# Patient Record
Sex: FEMALE | Race: WHITE | NOT HISPANIC OR LATINO | URBAN - METROPOLITAN AREA
[De-identification: names, ages, dates, MRNs, and addresses within clinical notes are randomized per-mention and may not be internally consistent; named-entity substitution may affect disease eponyms.]

---

## 2023-08-11 ENCOUNTER — OFFICE (OUTPATIENT)
Dept: URBAN - METROPOLITAN AREA CLINIC 64 | Facility: CLINIC | Age: 83
End: 2023-08-11

## 2023-08-11 VITALS
SYSTOLIC BLOOD PRESSURE: 143 MMHG | WEIGHT: 228 LBS | HEIGHT: 66 IN | DIASTOLIC BLOOD PRESSURE: 87 MMHG | HEART RATE: 67 BPM

## 2023-08-11 DIAGNOSIS — K75.4 AUTOIMMUNE HEPATITIS: ICD-10-CM

## 2023-08-11 DIAGNOSIS — K21.9 GASTRO-ESOPHAGEAL REFLUX DISEASE WITHOUT ESOPHAGITIS: ICD-10-CM

## 2023-08-11 DIAGNOSIS — K74.60 UNSPECIFIED CIRRHOSIS OF LIVER: ICD-10-CM

## 2023-08-11 DIAGNOSIS — R19.7 DIARRHEA, UNSPECIFIED: ICD-10-CM

## 2023-08-11 DIAGNOSIS — Z86.010 PERSONAL HISTORY OF COLONIC POLYPS: ICD-10-CM

## 2023-08-11 DIAGNOSIS — I85.00 ESOPHAGEAL VARICES WITHOUT BLEEDING: ICD-10-CM

## 2023-08-11 PROCEDURE — 99204 OFFICE O/P NEW MOD 45 MIN: CPT | Performed by: INTERNAL MEDICINE

## 2023-08-11 RX ORDER — PANTOPRAZOLE SODIUM 20 MG/1
20 TABLET, DELAYED RELEASE ORAL
Qty: 90 | Refills: 3 | Status: ACTIVE
Start: 2023-08-11

## 2023-08-11 RX ORDER — AZATHIOPRINE 50 MG/1
50 TABLET ORAL
Qty: 90 | Refills: 3 | Status: ACTIVE
Start: 2023-08-11

## 2023-08-15 ENCOUNTER — TRANSCRIBE ORDERS (OUTPATIENT)
Dept: ADMINISTRATIVE | Facility: HOSPITAL | Age: 83
End: 2023-08-15

## 2023-08-15 DIAGNOSIS — K74.69 OTHER CIRRHOSIS OF LIVER: ICD-10-CM

## 2023-08-15 DIAGNOSIS — K75.4 HEPATITIS, AUTOIMMUNE: Primary | ICD-10-CM

## 2023-08-31 ENCOUNTER — HOSPITAL ENCOUNTER (OUTPATIENT)
Dept: ULTRASOUND IMAGING | Facility: HOSPITAL | Age: 83
Discharge: HOME | End: 2023-08-31
Admitting: INTERNAL MEDICINE
Payer: MEDICARE

## 2023-08-31 DIAGNOSIS — K75.4 HEPATITIS, AUTOIMMUNE: ICD-10-CM

## 2023-08-31 DIAGNOSIS — K74.69 OTHER CIRRHOSIS OF LIVER: ICD-10-CM

## 2023-08-31 PROCEDURE — 76705 ECHO EXAM OF ABDOMEN: CPT

## 2024-04-05 ENCOUNTER — OFFICE (OUTPATIENT)
Dept: URBAN - METROPOLITAN AREA CLINIC 64 | Facility: CLINIC | Age: 84
End: 2024-04-05
Payer: COMMERCIAL

## 2024-04-05 ENCOUNTER — TRANSCRIBE ORDERS (OUTPATIENT)
Dept: ADMINISTRATIVE | Facility: HOSPITAL | Age: 84
End: 2024-04-05
Payer: MEDICARE

## 2024-04-05 VITALS
DIASTOLIC BLOOD PRESSURE: 78 MMHG | WEIGHT: 227 LBS | DIASTOLIC BLOOD PRESSURE: 83 MMHG | HEIGHT: 66 IN | HEART RATE: 62 BPM | SYSTOLIC BLOOD PRESSURE: 154 MMHG | SYSTOLIC BLOOD PRESSURE: 147 MMHG

## 2024-04-05 DIAGNOSIS — K75.4 AUTOIMMUNE HEPATITIS: Primary | ICD-10-CM

## 2024-04-05 DIAGNOSIS — K21.9 GASTRO-ESOPHAGEAL REFLUX DISEASE WITHOUT ESOPHAGITIS: ICD-10-CM

## 2024-04-05 DIAGNOSIS — K75.4 AUTOIMMUNE HEPATITIS: ICD-10-CM

## 2024-04-05 DIAGNOSIS — K74.60 HEPATIC CIRRHOSIS, UNSPECIFIED HEPATIC CIRRHOSIS TYPE, UNSPECIFIED WHETHER ASCITES PRESENT: ICD-10-CM

## 2024-04-05 DIAGNOSIS — K74.60 UNSPECIFIED CIRRHOSIS OF LIVER: ICD-10-CM

## 2024-04-05 DIAGNOSIS — I85.00 ESOPHAGEAL VARICES WITHOUT BLEEDING: ICD-10-CM

## 2024-04-05 PROCEDURE — 99214 OFFICE O/P EST MOD 30 MIN: CPT | Performed by: INTERNAL MEDICINE

## 2024-04-11 ENCOUNTER — HOSPITAL ENCOUNTER (OUTPATIENT)
Dept: ULTRASOUND IMAGING | Facility: HOSPITAL | Age: 84
Discharge: HOME | End: 2024-04-11
Payer: MEDICARE

## 2024-04-11 DIAGNOSIS — K75.4 AUTOIMMUNE HEPATITIS: ICD-10-CM

## 2024-04-11 DIAGNOSIS — K74.60 HEPATIC CIRRHOSIS, UNSPECIFIED HEPATIC CIRRHOSIS TYPE, UNSPECIFIED WHETHER ASCITES PRESENT: ICD-10-CM

## 2024-04-11 PROCEDURE — 76705 ECHO EXAM OF ABDOMEN: CPT

## 2024-05-29 NOTE — PROGRESS NOTES
New Shoulder      Patient: Elayne Stanford        YOB: 1940    Medical Record Number: [id number]        Chief Complaints: Right shoulder pain      History of Present Illness: This is a 83-year-old female who injured her right shoulder when she picked up a heavy object about 6 weeks ago injuring her shoulder she states she has severe pain at the time it is improved a little bit currently it is a 5 out of 10 past medical history as listed below and reviewed by me      Allergies:   Allergies   Allergen Reactions    Betadine [Povidone Iodine] Itching    Contrast Dye (Echo Or Unknown Ct/Mr) Itching    Latex Itching    Phenergan [Promethazine] Other (See Comments)     Lost a day and a half    Sulfa Antibiotics Itching       Medications:   Home Medications:  Current Outpatient Medications on File Prior to Visit   Medication Sig    amLODIPine (NORVASC) 5 MG tablet 90    Aspirin 81 MG capsule     atorvastatin (LIPITOR) 40 MG tablet 90    azaTHIOprine (IMURAN) 50 MG tablet     Biotin 1 MG capsule 0    celecoxib (CeleBREX) 200 MG capsule 180    Cholecalciferol 50 MCG (2000 UT) capsule 0    fexofenadine (Allegra Allergy) 60 MG tablet 0    irbesartan (AVAPRO) 150 MG tablet 90    levothyroxine (SYNTHROID, LEVOTHROID) 125 MCG tablet 90    pantoprazole (PROTONIX) 20 MG EC tablet TAKE 1 TABLET BY MOUTH EVERY MORNING 30 MINUTES BEFORE A MEAL    propranolol LA (INDERAL LA) 120 MG 24 hr capsule Take 1 capsule by mouth Every 12 (Twelve) Hours.    propranolol LA (INDERAL LA) 160 MG 24 hr capsule Take 1 capsule by mouth Daily.     No current facility-administered medications on file prior to visit.     Current Medications:  Scheduled Meds:  Continuous Infusions:No current facility-administered medications for this visit.    PRN Meds:.    Past Medical History:   Diagnosis Date    Arthritis of neck     Frozen shoulder 4/24    Reason for visit    Knee swelling     Replacements-both    Low back strain 2/24    Periarthritis  "of shoulder     Reason for visit    Rotator cuff syndrome     Reason for visit        Past Surgical History:   Procedure Laterality Date    FOOT SURGERY  15 years ago    Bone spur    JOINT REPLACEMENT  10 years    Both knees replaced    KNEE SURGERY  ?    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 female  General Appearance:    Alert, cooperative, in no acute distress                   Vitals:    05/30/24 1438   Temp: 98.8 °F (37.1 °C)   Weight: 100 kg (221 lb 4.8 oz)   Height: 167.6 cm (66\")   PainSc:   5      Patient is alert and read ×3 no acute distress appears her above-listed at height weight and age.  Affect is normal respiratory rate is normal unlabored. Heart rate regular rate rhythm, sclera, dentition and hearing are normal for the purpose of this exam.    Ortho Exam  Physical exam of the right shoulder reveals no overlying skin changes no lymphedema no lymphadenopathy.  Patient has active flexion 180 with mild symptoms abduction is similar external rotation is to 50 and internal rotation to the upper lumbar spine with mild symptoms.  Patient has good rotator cuff strength 4+ over 5 with isometric strength testing with pain.  Patient has a " positive impingement and a positive Acosta sign.  Patient has good cervical range of motion which is full and asymptomatic no radicular symptoms.  Patient has a normal elbow exam.  Good distal pulses are present  Patient has pain with overhead activity and a positive Neer sign and a positive empty can sign , a positive drop arm and a definitive painful arc   Large Joint Arthrocentesis: R subacromial bursa  Date/Time: 5/30/2024 3:15 PM  Consent given by: patient  Site marked: site marked  Timeout: Immediately prior to procedure a time out was called to verify the correct patient, procedure, equipment, support staff and site/side marked as required   Supporting Documentation  Indications: pain   Procedure Details  Location: shoulder - R subacromial bursa  Preparation: Patient was prepped and draped in the usual sterile fashion  Needle gauge: 21G.  Approach: posterior  Medications administered: 80 mg methylPREDNISolone acetate 80 MG/ML; 2 mL lidocaine PF 1% 1 %  Patient tolerance: patient tolerated the procedure well with no immediate complications              Radiology:   AP, Scapular Y and Axillary Lateral of the right shoulder were ordered/reviewed to evauate shoulder pain.  I have no comparative films she has severe acromioclavicular arthritis as well as some sclerosis at the insertion of the cuff no obvious fracture  Imaging Results (Most Recent)       Procedure Component Value Units Date/Time    XR Shoulder 2+ View Right [[id number]] Resulted: 05/30/24 1429     Updated: 05/30/24 1430    Impression:      Ordering physician's impression is located in the Encounter Note dated 05/30/24. X-ray performed in the DR room.            Assessment/Plan: Right shoulder pain I suspect this is rotator cuff probably acute on chronic plan is to proceed with injection as a diagnostic and therapeutic tool started with physical therapy should she fail this we will pursue other means of testing  Cortisone Injection. See procedure  note.  Cortisone Injection for DIAGNOSTIC and THERAPUTIC purposes.

## 2024-05-30 ENCOUNTER — OFFICE VISIT (OUTPATIENT)
Dept: ORTHOPEDIC SURGERY | Facility: CLINIC | Age: 84
End: 2024-05-30
Payer: MEDICARE

## 2024-05-30 VITALS — HEIGHT: 66 IN | TEMPERATURE: 98.8 F | WEIGHT: 221.3 LBS | BODY MASS INDEX: 35.57 KG/M2

## 2024-05-30 DIAGNOSIS — M25.512 LEFT SHOULDER PAIN, UNSPECIFIED CHRONICITY: Primary | ICD-10-CM

## 2024-05-30 DIAGNOSIS — M25.511 RIGHT SHOULDER PAIN, UNSPECIFIED CHRONICITY: ICD-10-CM

## 2024-05-30 DIAGNOSIS — M75.101 TEAR OF RIGHT ROTATOR CUFF, UNSPECIFIED TEAR EXTENT, UNSPECIFIED WHETHER TRAUMATIC: ICD-10-CM

## 2024-05-30 PROCEDURE — 99203 OFFICE O/P NEW LOW 30 MIN: CPT | Performed by: ORTHOPAEDIC SURGERY

## 2024-05-30 PROCEDURE — 1160F RVW MEDS BY RX/DR IN RCRD: CPT | Performed by: ORTHOPAEDIC SURGERY

## 2024-05-30 PROCEDURE — 73030 X-RAY EXAM OF SHOULDER: CPT | Performed by: ORTHOPAEDIC SURGERY

## 2024-05-30 PROCEDURE — 1159F MED LIST DOCD IN RCRD: CPT | Performed by: ORTHOPAEDIC SURGERY

## 2024-05-30 PROCEDURE — 20610 DRAIN/INJ JOINT/BURSA W/O US: CPT | Performed by: ORTHOPAEDIC SURGERY

## 2024-05-30 RX ORDER — CELECOXIB 200 MG/1
CAPSULE ORAL
COMMUNITY

## 2024-05-30 RX ORDER — ATORVASTATIN CALCIUM 40 MG/1
TABLET, FILM COATED ORAL
COMMUNITY

## 2024-05-30 RX ORDER — AZATHIOPRINE 50 MG/1
TABLET ORAL
COMMUNITY
Start: 2024-04-11

## 2024-05-30 RX ORDER — FEXOFENADINE HCL 60 MG/1
TABLET, FILM COATED ORAL
COMMUNITY

## 2024-05-30 RX ORDER — IRBESARTAN 150 MG/1
TABLET ORAL
COMMUNITY

## 2024-05-30 RX ORDER — PROPRANOLOL HYDROCHLORIDE 120 MG/1
1 CAPSULE, EXTENDED RELEASE ORAL EVERY 12 HOURS SCHEDULED
COMMUNITY
Start: 2024-02-20

## 2024-05-30 RX ORDER — AMLODIPINE BESYLATE 5 MG/1
TABLET ORAL
COMMUNITY

## 2024-05-30 RX ORDER — PANTOPRAZOLE SODIUM 20 MG/1
TABLET, DELAYED RELEASE ORAL
COMMUNITY
Start: 2024-03-12

## 2024-05-30 RX ORDER — LEVOTHYROXINE SODIUM 0.12 MG/1
TABLET ORAL
COMMUNITY

## 2024-05-30 RX ORDER — PROPRANOLOL HYDROCHLORIDE 160 MG/1
160 CAPSULE, EXTENDED RELEASE ORAL DAILY
COMMUNITY

## 2024-05-30 RX ORDER — NICOTINE POLACRILEX 2 MG
GUM BUCCAL
COMMUNITY

## 2024-05-30 RX ADMIN — METHYLPREDNISOLONE ACETATE 80 MG: 80 INJECTION, SUSPENSION INTRA-ARTICULAR; INTRALESIONAL; INTRAMUSCULAR; SOFT TISSUE at 15:15

## 2024-05-30 RX ADMIN — LIDOCAINE HYDROCHLORIDE 2 ML: 10 INJECTION, SOLUTION EPIDURAL; INFILTRATION; INTRACAUDAL; PERINEURAL at 15:15

## 2024-06-04 RX ORDER — METHYLPREDNISOLONE ACETATE 80 MG/ML
80 INJECTION, SUSPENSION INTRA-ARTICULAR; INTRALESIONAL; INTRAMUSCULAR; SOFT TISSUE
Status: COMPLETED | OUTPATIENT
Start: 2024-05-30 | End: 2024-05-30

## 2024-06-04 RX ORDER — LIDOCAINE HYDROCHLORIDE 10 MG/ML
2 INJECTION, SOLUTION EPIDURAL; INFILTRATION; INTRACAUDAL; PERINEURAL
Status: COMPLETED | OUTPATIENT
Start: 2024-05-30 | End: 2024-05-30

## 2024-07-02 ENCOUNTER — OFFICE VISIT (OUTPATIENT)
Dept: SPORTS MEDICINE | Facility: CLINIC | Age: 84
End: 2024-07-02
Payer: MEDICARE

## 2024-07-02 VITALS
HEIGHT: 66 IN | DIASTOLIC BLOOD PRESSURE: 80 MMHG | WEIGHT: 221 LBS | BODY MASS INDEX: 35.52 KG/M2 | HEART RATE: 64 BPM | OXYGEN SATURATION: 93 % | SYSTOLIC BLOOD PRESSURE: 172 MMHG | TEMPERATURE: 98.4 F

## 2024-07-02 DIAGNOSIS — R29.898 LEFT LEG WEAKNESS: ICD-10-CM

## 2024-07-02 DIAGNOSIS — G89.29 CHRONIC MIDLINE LOW BACK PAIN WITH LEFT-SIDED SCIATICA: Primary | ICD-10-CM

## 2024-07-02 DIAGNOSIS — M54.42 CHRONIC MIDLINE LOW BACK PAIN WITH LEFT-SIDED SCIATICA: Primary | ICD-10-CM

## 2024-07-02 DIAGNOSIS — M51.36 DDD (DEGENERATIVE DISC DISEASE), LUMBAR: ICD-10-CM

## 2024-07-02 PROCEDURE — 1159F MED LIST DOCD IN RCRD: CPT | Performed by: STUDENT IN AN ORGANIZED HEALTH CARE EDUCATION/TRAINING PROGRAM

## 2024-07-02 PROCEDURE — 1160F RVW MEDS BY RX/DR IN RCRD: CPT | Performed by: STUDENT IN AN ORGANIZED HEALTH CARE EDUCATION/TRAINING PROGRAM

## 2024-07-02 PROCEDURE — 99203 OFFICE O/P NEW LOW 30 MIN: CPT | Performed by: STUDENT IN AN ORGANIZED HEALTH CARE EDUCATION/TRAINING PROGRAM

## 2024-07-02 RX ORDER — CYANOCOBALAMIN/FOLIC ACID 1MG-400MCG
TABLET, SUBLINGUAL SUBLINGUAL
COMMUNITY

## 2024-07-02 RX ORDER — TRAMADOL HYDROCHLORIDE 50 MG/1
TABLET ORAL
COMMUNITY

## 2024-07-02 RX ORDER — ALBUTEROL SULFATE 90 UG/1
2 AEROSOL, METERED RESPIRATORY (INHALATION) EVERY 6 HOURS PRN
COMMUNITY
Start: 2024-03-14

## 2024-07-02 NOTE — PROGRESS NOTES
Chief Complaint   Patient presents with    Lumbar Spine - Initial Evaluation       History of Present Illness  Elayne is a 83 y.o. year old female here today for low back pain that has been present for about 2 years with no known injury or trauma. Does admit that pain started around the time that her 's health declined before he passed away and she was doing a lot to help care for him, move him, etc.   Pain varies and is described as a shooting pain  Pain is located at the left side of the low back and radiates down the back of the left leg to the knee.   Admits to associated numbness of left 3rd-5th toes.  Also states that occasionally all her toe nails will turn red  Pain worsens with increased activity. Started using a wheelchair for longer distance walks[place] airport.   Has been managing symptoms with Tramadol and Celebrex, which she is prescribed for chronic pain.   Was unable to get refill of the tramadol because of a recent move 2 years ago and changes to her PCP. Just established with new PCP yesterday was given a prescription of tramadol but she has not yet started it.   Denies any previous injury or occurrence.  However, she does have a history of RA and bilateral TKA.  Also has a history of autoimmune cirrhosis of the liver.      The following data was reviewed by: Kassi Zaragoza DO on 07/02/2024:  Data reviewed :        COMPREHENSIVE METABOLIC PANEL  Order: [id number]  Component  Ref Range & Units 9 mo ago   Sodium  136 - 145 mmol/L 134 Low    Comment: (note)  Excess protein and/or lipids can falsely decrease sodium levels  (pseudo hyponatremia).   Potassium  3.5 - 5.1 mmol/L 4.5   Comment: Falsely elevated potassium can occur in patients with high WBC or platelet counts.   Chloride  98 - 107 mmol/L 104   Comment: (note)  Falsely elevated chloride levels can be seen in patients taking  medications which contain bromide.   Total CO2  22 - 29 mmol/L 24   Anion Gap  5 - 13 (arb'U) 6  "  Comment: (note)  Calculation- Na - (Cl + CO2)   Glucose  71 - 139 mg/dL 106   Comment: (note)  Reference range based on inpatient hypo/hyperglycemic treatment  levels. A random glucose =/>200 is concerning for poor control.   BUN  10 - 20 mg/dL 22 High    Creatinine  0.55 - 1.02 mg/dL 0.79   BUN/Creatinine Ratio  RATIO 27.8   Estimated GFR (Cr)  >60 /1.73 m2 75   Comment: (note)  eGFR calculated based on IDMS traceable, enzymatic creatinine  method using the CKD-EPI 2021 equation.   Total Protein  6.2 - 8.0 g/dL 8.4 High    Albumin  3.2 - 4.6 g/dL 4.0   Globulin  1.5 - 4.5 g/dL 4.4   A/G Ratio  1.1 - 2.5 RATIO 0.9 Low    Calcium  8.4 - 10.2 mg/dL 10.5 High    Total Bilirubin  0.2 - 1.2 mg/dL 0.9   AST (SGOT)  5 - 34 U/L 21   ALT (SGPT)  0 - 55 U/L 14   Alkaline Phosphatase  40 - 150 U/L 101   Resulting Agency [facility]     Specimen Collected: 09/25/23 11:26    Performed by: [facility] Last Resulted: 09/25/23 21:40   Received From: [facility]  Result Received: 04/09/24 09:04         Review of Systems   Constitutional:  Positive for activity change and fatigue.   HENT:  Positive for hearing loss (hearing aids) and sinus pressure.    Respiratory:  Positive for shortness of breath and wheezing.    Endocrine: Positive for heat intolerance.   Genitourinary:  Positive for flank pain.   Musculoskeletal:  Positive for back pain.   Allergic/Immunologic: Positive for environmental allergies.   Neurological:  Positive for tremors.   All other systems reviewed and are negative.  These are chronic issues with no acute or relevant concerns aside from what was discussed in HPI.    /80 (BP Location: Right arm, Patient Position: Sitting, Cuff Size: Large Adult)   Pulse 64   Temp 98.4 °F (36.9 °C) (Infrared)   Ht 167.6 cm (66\")   Wt 100 kg (221 lb)   SpO2 93%   BMI 35.67 kg/m²        Physical Exam  Vital signs reviewed.   General: Well developed, well nourished; No acute distress.  Eyes: conjunctiva clear; pupils " equally round and reactive  ENT: external ears and nose atraumatic; hearing normal  CV: no peripheral edema, 2+ distal pulses  Resp: normal respiratory effort, no use of accessory muscles  Skin: normal color and pigmentation; no rashes or wounds; normal turgor  Psych: alert and oriented; mood and affect appropriate; recent and remote memory intact    MSK Exam:  The lumbar spine with signs of scoliosis, but no acute deformity, erythema, or ecchymosis. There is mild midline and paraspinal lumbar tenderness, but no focal findings. No pelvic bony tenderness. There is no tenderness of the iliopsoas, gluteus, or piriformis musculature. Lumbar range of motion is limited in all directions with pain at end range. Lower extremity strength with weakness on the left compared to the right.  Decreased sensation to light touch on the left.    Lumbar Spine X-Ray  Indication: Pain  Views: AP and Lateral  Findings:  No fracture  No bony lesion  Thoracolumbar dextroscoliosis  Diffuse DDD and spondylolisthesis, with anterolisthesis of T12 on L1 and L4 on L5 and  retrolisthesis of L1 on L2 and L2 on L3  Diffuse facet arthropathy, worse in the lower lumbars, and anterior end plate spurring, worse in the lower thoracic and upper lumbar region  Signs from previous hernia repair  No prior studies were available for comparison.    Assessment and Plan  Diagnoses and all orders for this visit:    1. Chronic midline low back pain with left-sided sciatica (Primary)  -     Ambulatory Referral to Physical Therapy    2. DDD (degenerative disc disease), lumbar  -     Ambulatory Referral to Physical Therapy    3. Left leg weakness  -     Ambulatory Referral to Physical Therapy    Elayne is a 83 y.o. year old female here today for low back pain that has been present for about 2 years with no known injury or trauma, but did begin around the time that her 's health declined before he passed away and she was doing a lot to help care for him. We  reviewed images and exam findings. Initial x-rays show thoracolumbar dextroscoliosis, and multilevel DDD and spondylolisthesis. No new or concerning symptoms recently, therefore I recommend  trial of conservative management. An order was provided for physical therapy to work on range of motion, strengthening, and overall mobility and endurance. She may continue with medications as previously prescribed and supportive measures such as heat and rest as needed. She may continue with activity as tolerated but should avoid painful or overly strenuous activity. We will follow-up in 8 weeks, or sooner as needed.  Reviewed signs and symptoms that would warrant more urgent evaluation.  All of her questions were answered and she is agreeable with the plan.    Dictated utilizing Dragon dictation.

## 2024-09-13 ENCOUNTER — TELEPHONE (OUTPATIENT)
Dept: ORTHOPEDIC SURGERY | Facility: CLINIC | Age: 84
End: 2024-09-13

## 2024-09-13 NOTE — TELEPHONE ENCOUNTER
Caller: Elayne Stanford    Relationship to patient: Self    Best call back number: [phone] (home)     Chief complaint: RIGHT SHOULDER INJECTION LAST- // 09/11/24 LVM TO [facility]S OUT//MS     Type of visit: FOLLOW UP    Requested date: AROUND THE SAME DATE - PT PREFERS [facility]    If rescheduling, when is the original appointment: 9/20/24     Additional notes: NEXT AVAILABLE APPT IS 10/17/24    HUB UNABLE TO TRANSFER

## 2024-09-27 ENCOUNTER — TELEPHONE (OUTPATIENT)
Dept: SPORTS MEDICINE | Facility: CLINIC | Age: 84
End: 2024-09-27

## 2024-09-27 DIAGNOSIS — M54.42 CHRONIC MIDLINE LOW BACK PAIN WITH LEFT-SIDED SCIATICA: Primary | ICD-10-CM

## 2024-09-27 DIAGNOSIS — R29.898 LEFT LEG WEAKNESS: ICD-10-CM

## 2024-09-27 DIAGNOSIS — M51.369 DDD (DEGENERATIVE DISC DISEASE), LUMBAR: ICD-10-CM

## 2024-09-27 DIAGNOSIS — G89.29 CHRONIC MIDLINE LOW BACK PAIN WITH LEFT-SIDED SCIATICA: Primary | ICD-10-CM

## 2024-09-27 NOTE — TELEPHONE ENCOUNTER
Caller: Elayne Stanford    Relationship: Self    Best call back number: [phone]    What form or medical record are you requesting: NEW REFERRAL FOR P/T FOR BACK/LEG WEAKNESS    Who is requesting this form or medical record from you: [facility] AT [facility]    How would you like to receive the form or medical records (pick-up, mail, fax): FAX  If fax, what is the fax number: [phone]    Timeframe paperwork needed: ASAP    Additional notes: PATIENT STATES [facility] WANTS TO EXTEND HER THERAPY TO WORK ON HER BALANCE

## 2024-10-14 ENCOUNTER — TELEPHONE (OUTPATIENT)
Dept: SPORTS MEDICINE | Facility: CLINIC | Age: 84
End: 2024-10-14
Payer: MEDICARE

## 2024-10-14 DIAGNOSIS — M54.42 CHRONIC MIDLINE LOW BACK PAIN WITH LEFT-SIDED SCIATICA: Primary | ICD-10-CM

## 2024-10-14 DIAGNOSIS — G89.29 CHRONIC MIDLINE LOW BACK PAIN WITH LEFT-SIDED SCIATICA: Primary | ICD-10-CM

## 2024-10-14 DIAGNOSIS — R26.89 IMPAIRMENT OF BALANCE: ICD-10-CM

## 2024-10-14 DIAGNOSIS — R29.898 LEFT LEG WEAKNESS: ICD-10-CM

## 2024-10-14 DIAGNOSIS — M51.369 DEGENERATION OF INTERVERTEBRAL DISC OF LUMBAR REGION, UNSPECIFIED WHETHER PAIN PRESENT: ICD-10-CM

## 2024-10-14 NOTE — TELEPHONE ENCOUNTER
Pt calling to request updated PT order for [facility] ([place]) that needs to state for balance.  Please fax to [facility] as pt has appointment on 10/15/24

## 2024-10-15 ENCOUNTER — OFFICE (OUTPATIENT)
Age: 84
End: 2024-10-15

## 2024-10-15 ENCOUNTER — OFFICE (OUTPATIENT)
Dept: URBAN - METROPOLITAN AREA CLINIC 64 | Facility: CLINIC | Age: 84
End: 2024-10-15

## 2024-10-15 VITALS
WEIGHT: 230 LBS | WEIGHT: 230 LBS | DIASTOLIC BLOOD PRESSURE: 66 MMHG | WEIGHT: 230 LBS | HEIGHT: 66 IN | WEIGHT: 230 LBS | DIASTOLIC BLOOD PRESSURE: 66 MMHG | SYSTOLIC BLOOD PRESSURE: 128 MMHG | SYSTOLIC BLOOD PRESSURE: 128 MMHG | WEIGHT: 230 LBS | HEART RATE: 75 BPM | WEIGHT: 230 LBS | SYSTOLIC BLOOD PRESSURE: 128 MMHG | HEART RATE: 75 BPM | HEIGHT: 66 IN | HEART RATE: 75 BPM | HEART RATE: 75 BPM | DIASTOLIC BLOOD PRESSURE: 66 MMHG | DIASTOLIC BLOOD PRESSURE: 66 MMHG | DIASTOLIC BLOOD PRESSURE: 66 MMHG | HEART RATE: 75 BPM | HEIGHT: 66 IN | SYSTOLIC BLOOD PRESSURE: 128 MMHG | HEIGHT: 66 IN | DIASTOLIC BLOOD PRESSURE: 66 MMHG | HEIGHT: 66 IN | SYSTOLIC BLOOD PRESSURE: 128 MMHG | SYSTOLIC BLOOD PRESSURE: 128 MMHG | SYSTOLIC BLOOD PRESSURE: 128 MMHG | HEIGHT: 66 IN | HEART RATE: 75 BPM | HEART RATE: 75 BPM | DIASTOLIC BLOOD PRESSURE: 66 MMHG | WEIGHT: 230 LBS | HEIGHT: 66 IN

## 2024-10-15 DIAGNOSIS — R19.4 CHANGE IN BOWEL HABIT: ICD-10-CM

## 2024-10-15 DIAGNOSIS — K75.4 AUTOIMMUNE HEPATITIS: ICD-10-CM

## 2024-10-15 DIAGNOSIS — K74.60 UNSPECIFIED CIRRHOSIS OF LIVER: ICD-10-CM

## 2024-10-15 DIAGNOSIS — R10.32 LEFT LOWER QUADRANT PAIN: ICD-10-CM

## 2024-10-15 PROCEDURE — 99214 OFFICE O/P EST MOD 30 MIN: CPT | Performed by: INTERNAL MEDICINE

## 2024-10-17 ENCOUNTER — TRANSCRIBE ORDERS (OUTPATIENT)
Dept: ADMINISTRATIVE | Facility: HOSPITAL | Age: 84
End: 2024-10-17
Payer: MEDICARE

## 2024-10-17 ENCOUNTER — OFFICE VISIT (OUTPATIENT)
Dept: ORTHOPEDIC SURGERY | Facility: CLINIC | Age: 84
End: 2024-10-17
Payer: MEDICARE

## 2024-10-17 VITALS — HEIGHT: 66 IN | TEMPERATURE: 98.2 F | BODY MASS INDEX: 36.96 KG/M2 | WEIGHT: 230 LBS

## 2024-10-17 DIAGNOSIS — L98.9 SKIN LESION: ICD-10-CM

## 2024-10-17 DIAGNOSIS — R10.32 ABDOMINAL PAIN, LEFT LOWER QUADRANT: ICD-10-CM

## 2024-10-17 DIAGNOSIS — K74.60 HEPATIC CIRRHOSIS, UNSPECIFIED HEPATIC CIRRHOSIS TYPE, UNSPECIFIED WHETHER ASCITES PRESENT: Primary | ICD-10-CM

## 2024-10-17 DIAGNOSIS — M75.101 TEAR OF RIGHT ROTATOR CUFF, UNSPECIFIED TEAR EXTENT, UNSPECIFIED WHETHER TRAUMATIC: Primary | ICD-10-CM

## 2024-10-17 DIAGNOSIS — K75.4 HEPATITIS, AUTOIMMUNE: ICD-10-CM

## 2024-10-17 DIAGNOSIS — R19.4 CHANGE IN BOWEL HABITS: ICD-10-CM

## 2024-10-17 RX ORDER — LIDOCAINE HYDROCHLORIDE 10 MG/ML
2 INJECTION, SOLUTION EPIDURAL; INFILTRATION; INTRACAUDAL; PERINEURAL
Status: COMPLETED | OUTPATIENT
Start: 2024-10-17 | End: 2024-10-17

## 2024-10-17 RX ORDER — METHYLPREDNISOLONE ACETATE 80 MG/ML
80 INJECTION, SUSPENSION INTRA-ARTICULAR; INTRALESIONAL; INTRAMUSCULAR; SOFT TISSUE
Status: COMPLETED | OUTPATIENT
Start: 2024-10-17 | End: 2024-10-17

## 2024-10-17 RX ADMIN — LIDOCAINE HYDROCHLORIDE 2 ML: 10 INJECTION, SOLUTION EPIDURAL; INFILTRATION; INTRACAUDAL; PERINEURAL at 10:31

## 2024-10-17 RX ADMIN — METHYLPREDNISOLONE ACETATE 80 MG: 80 INJECTION, SUSPENSION INTRA-ARTICULAR; INTRALESIONAL; INTRAMUSCULAR; SOFT TISSUE at 10:31

## 2024-10-17 NOTE — PROGRESS NOTES
Patient: Elayne Stanford  YOB: 1940  Date of Service: 10/17/2024    Chief Complaints:   Right shoulder pain  Subjective:    History of Present Illness: Pt is seen in the office today with complaints of right shoulder pain I last saw her in May we really thought was rotator cuff we injected she got good relief she is happy with where she is she is not interested in surgical she would like to do another injection        Allergies:   Allergies   Allergen Reactions    Iodinated Contrast Media Itching    Betadine [Povidone Iodine] Itching    Contrast Dye (Echo Or Unknown Ct/Mr) Itching    Latex Itching    Phenergan [Promethazine] Other (See Comments)     Lost a day and a half    Sulfa Antibiotics Itching       Medications:   Home Medications:  Current Outpatient Medications on File Prior to Visit   Medication Sig    albuterol sulfate  (90 Base) MCG/ACT inhaler Inhale 2 puffs Every 6 (Six) Hours As Needed.    amLODIPine (NORVASC) 5 MG tablet 90    Aspirin 81 MG capsule     atorvastatin (LIPITOR) 40 MG tablet 90    azaTHIOprine (IMURAN) 50 MG tablet     Biotin 1 MG capsule 0    celecoxib (CeleBREX) 200 MG capsule 180    Cholecalciferol 50 MCG (2000 UT) capsule 0    Cobalamin Combinations (B-12) 1000-400 MCG sublingual tablet     fexofenadine (Allegra Allergy) 60 MG tablet 0    irbesartan (AVAPRO) 150 MG tablet 90    levothyroxine (SYNTHROID, LEVOTHROID) 125 MCG tablet 90    Melatonin 1 MG capsule 0    pantoprazole (PROTONIX) 20 MG EC tablet TAKE 1 TABLET BY MOUTH EVERY MORNING 30 MINUTES BEFORE A MEAL    propranolol LA (INDERAL LA) 120 MG 24 hr capsule Take 1 capsule by mouth Every 12 (Twelve) Hours.    propranolol LA (INDERAL LA) 160 MG 24 hr capsule Take 1 capsule by mouth Daily.    traMADol (ULTRAM) 50 MG tablet 90     No current facility-administered medications on file prior to visit.     Current Medications:  Scheduled Meds:  Continuous Infusions:No current facility-administered medications  for this visit.    PRN Meds:.    I have reviewed the patient's medical history in detail and updated the computerized patient record.  Review and summarization of old records include:    Past Medical History:   Diagnosis Date    Arthritis of neck     Cirrhosis     liver caused by autoimmune disease    Frozen shoulder 4/24    Reason for visit    Knee swelling     Replacements-both    Low back strain 2/24    Periarthritis of shoulder     Reason for visit    Rotator cuff syndrome     Reason for visit        Past Surgical History:   Procedure Laterality Date    FOOT SURGERY  15 years ago    Bone spur    JOINT REPLACEMENT  10 years    Both knees replaced    KNEE SURGERY  ?    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     Allergies:   Allergies   Allergen Reactions    Iodinated Contrast Media Itching    Betadine [Povidone Iodine] Itching    Contrast Dye (Echo Or Unknown Ct/Mr) Itching    Latex Itching    Phenergan [Promethazine] Other (See Comments)     Lost a day and a half    Sulfa Antibiotics Itching     Constitutional:  Denies fever, shaking or chills   Eyes:  Denies change  in visual acuity   HENT:  Denies nasal congestion or sore throat   Respiratory:  Denies cough or shortness of breath   Cardiovascular:  Denies chest pain or severe LE edema   GI:  Denies abdominal pain, nausea, vomiting, bloody stools or diarrhea   Musculoskeletal:  Numbness, tingling, or loss of motor function only as noted above in history of present illness.  : Denies painful urination or hematuria  Integument:  Denies rash, lesion or ulceration   Neurologic:  Denies headache or focal weakness  Endocrine:  Denies lymphadenopathy  Psych:  Denies confusion or change in mental status   Hem:  Denies active bleeding      Physical Exam: 83 y.o. female  Wt Readings from Last 3 Encounters:   07/02/24 100 kg (221 lb)   05/30/24 100 kg (221 lb 4.8 oz)       There is no height or weight on file to calculate BMI.    There were no vitals filed for this visit.  Vital signs reviewed.   General Appearance:    Alert, cooperative, in no acute distress                    Ortho exam      Exam is unchanged           Assessment: Right shoulder pain which we think is rotator cuff    Plan: Injection she understands her options she is not interested in think surgical we will do an injection  Follow up as indicated.  Ice, elevate, and rest as needed.  Discussed conservative measures of pain control including ice, bracing.  Also talked about the importance of strengthening   Julianna Snider M.D.    Large Joint Arthrocentesis: R subacromial bursa  Date/Time: 10/17/2024 10:31 AM  Consent given by: patient  Site marked: site marked  Timeout: Immediately prior to procedure a time out was called to verify the correct patient, procedure, equipment, support staff and site/side marked as required   Supporting Documentation  Indications: pain   Procedure Details  Location: shoulder - R subacromial bursa  Preparation: Patient was prepped and draped in the usual sterile fashion  Needle gauge: 21G.  Approach: posterior  Medications administered: 80 mg  methylPREDNISolone acetate 80 MG/ML; 2 mL lidocaine PF 1% 1 %  Patient tolerance: patient tolerated the procedure well with no immediate complications

## 2024-11-11 ENCOUNTER — TELEPHONE (OUTPATIENT)
Dept: SPORTS MEDICINE | Facility: CLINIC | Age: 84
End: 2024-11-11

## 2024-11-11 DIAGNOSIS — R29.898 LEFT LEG WEAKNESS: ICD-10-CM

## 2024-11-11 DIAGNOSIS — M51.369 DEGENERATION OF INTERVERTEBRAL DISC OF LUMBAR REGION, UNSPECIFIED WHETHER PAIN PRESENT: ICD-10-CM

## 2024-11-11 DIAGNOSIS — M54.42 CHRONIC MIDLINE LOW BACK PAIN WITH LEFT-SIDED SCIATICA: Primary | ICD-10-CM

## 2024-11-11 DIAGNOSIS — G89.29 CHRONIC MIDLINE LOW BACK PAIN WITH LEFT-SIDED SCIATICA: Primary | ICD-10-CM

## 2024-11-11 NOTE — TELEPHONE ENCOUNTER
Caller: Elayne Stanford    Relationship: Self    Best call back number: [phone]    What is the medical concern/diagnosis: SCIATIC PAIN    What specialty or service is being requested: PAIN MANAGEMENT    What is the provider, practice or medical service name:DR CULLEN AT [facility]    What is the office phone number: FAX NUMBER [phone]    Any additional details: PT REQUESTING REFERRAL TO [facility] FOR SCIATIC PAIN. PLEASE CONTACT PT ONCE REFERRAL HAS BEEN SENT.

## 2024-11-20 ENCOUNTER — HOSPITAL ENCOUNTER (OUTPATIENT)
Dept: CT IMAGING | Facility: HOSPITAL | Age: 84
Discharge: HOME | End: 2024-11-20
Admitting: INTERNAL MEDICINE
Payer: MEDICARE

## 2024-11-20 DIAGNOSIS — L98.9 SKIN LESION: ICD-10-CM

## 2024-11-20 DIAGNOSIS — K74.60 HEPATIC CIRRHOSIS, UNSPECIFIED HEPATIC CIRRHOSIS TYPE, UNSPECIFIED WHETHER ASCITES PRESENT: ICD-10-CM

## 2024-11-20 DIAGNOSIS — R19.4 CHANGE IN BOWEL HABITS: ICD-10-CM

## 2024-11-20 DIAGNOSIS — K75.4 HEPATITIS, AUTOIMMUNE: ICD-10-CM

## 2024-11-20 DIAGNOSIS — R10.32 ABDOMINAL PAIN, LEFT LOWER QUADRANT: ICD-10-CM

## 2024-11-20 PROCEDURE — 25510000002 DIATRIZOATE MEGLUMINE & SODIUM PER 1 ML: Performed by: INTERNAL MEDICINE

## 2024-11-20 PROCEDURE — 74177 CT ABD & PELVIS W/CONTRAST: CPT

## 2024-11-20 PROCEDURE — 25510000001 IOPAMIDOL 61 % SOLUTION: Performed by: INTERNAL MEDICINE

## 2024-11-20 RX ORDER — IOPAMIDOL 612 MG/ML
100 INJECTION, SOLUTION INTRAVASCULAR
Status: COMPLETED | OUTPATIENT
Start: 2024-11-20 | End: 2024-11-20

## 2024-11-20 RX ORDER — DIATRIZOATE MEGLUMINE AND DIATRIZOATE SODIUM 660; 100 MG/ML; MG/ML
30 SOLUTION ORAL; RECTAL
Status: COMPLETED | OUTPATIENT
Start: 2024-11-20 | End: 2024-11-20

## 2024-11-20 RX ADMIN — DIATRIZOATE MEGLUMINE AND DIATRIZOATE SODIUM 30 ML: 660; 100 LIQUID ORAL; RECTAL at 09:30

## 2024-11-20 RX ADMIN — IOPAMIDOL 85 ML: 612 INJECTION, SOLUTION INTRAVENOUS at 10:35

## 2025-06-25 ENCOUNTER — OFFICE (OUTPATIENT)
Dept: URBAN - METROPOLITAN AREA CLINIC 64 | Facility: CLINIC | Age: 85
End: 2025-06-25
Payer: MEDICARE

## 2025-06-25 VITALS
HEART RATE: 72 BPM | DIASTOLIC BLOOD PRESSURE: 72 MMHG | WEIGHT: 230 LBS | SYSTOLIC BLOOD PRESSURE: 128 MMHG | HEIGHT: 66 IN

## 2025-06-25 DIAGNOSIS — K75.4 AUTOIMMUNE HEPATITIS: ICD-10-CM

## 2025-06-25 DIAGNOSIS — R10.32 LEFT LOWER QUADRANT PAIN: ICD-10-CM

## 2025-06-25 DIAGNOSIS — K74.60 UNSPECIFIED CIRRHOSIS OF LIVER: ICD-10-CM

## 2025-06-25 DIAGNOSIS — K21.9 GASTRO-ESOPHAGEAL REFLUX DISEASE WITHOUT ESOPHAGITIS: ICD-10-CM

## 2025-06-25 PROCEDURE — 99214 OFFICE O/P EST MOD 30 MIN: CPT | Performed by: INTERNAL MEDICINE

## 2025-06-25 RX ORDER — PANTOPRAZOLE SODIUM 20 MG/1
20 TABLET, DELAYED RELEASE ORAL
Qty: 90 | Refills: 3 | Status: ACTIVE
Start: 2025-06-25

## 2025-06-25 RX ORDER — FAMOTIDINE 40 MG/1
40 TABLET, FILM COATED ORAL
Qty: 90 | Refills: 3 | Status: ACTIVE
Start: 2025-06-25

## 2025-06-25 RX ORDER — AZATHIOPRINE 50 1/1
TABLET ORAL
Qty: 90 | Refills: 3 | Status: ACTIVE

## 2025-06-25 RX ORDER — HYOSCYAMINE SULFATE 0.12 MG/1
0.5 TABLET ORAL; SUBLINGUAL
Qty: 60 | Refills: 5 | Status: ACTIVE
Start: 2025-06-25

## 2025-06-26 LAB
AFP, SERUM, TUMOR MARKER: 5.7 NG/ML (ref 0–8.7)
CBC WITH DIFFERENTIAL/PLATELET: BASO (ABSOLUTE): 0.1 X10E3/UL (ref 0–0.2)
CBC WITH DIFFERENTIAL/PLATELET: BASOS: 1 %
CBC WITH DIFFERENTIAL/PLATELET: EOS (ABSOLUTE): 0.1 X10E3/UL (ref 0–0.4)
CBC WITH DIFFERENTIAL/PLATELET: EOS: 2 %
CBC WITH DIFFERENTIAL/PLATELET: HEMATOCRIT: 44.3 % (ref 34–46.6)
CBC WITH DIFFERENTIAL/PLATELET: HEMATOLOGY COMMENTS: (no result)
CBC WITH DIFFERENTIAL/PLATELET: HEMOGLOBIN: 13.8 G/DL (ref 11.1–15.9)
CBC WITH DIFFERENTIAL/PLATELET: IMMATURE CELLS: (no result)
CBC WITH DIFFERENTIAL/PLATELET: IMMATURE GRANS (ABS): 0 X10E3/UL (ref 0–0.1)
CBC WITH DIFFERENTIAL/PLATELET: IMMATURE GRANULOCYTES: 0 %
CBC WITH DIFFERENTIAL/PLATELET: LYMPHS (ABSOLUTE): 1.7 X10E3/UL (ref 0.7–3.1)
CBC WITH DIFFERENTIAL/PLATELET: LYMPHS: 27 %
CBC WITH DIFFERENTIAL/PLATELET: MCH: 30.4 PG (ref 26.6–33)
CBC WITH DIFFERENTIAL/PLATELET: MCHC: 31.2 G/DL — LOW (ref 31.5–35.7)
CBC WITH DIFFERENTIAL/PLATELET: MCV: 98 FL — HIGH (ref 79–97)
CBC WITH DIFFERENTIAL/PLATELET: MONOCYTES(ABSOLUTE): 0.9 X10E3/UL (ref 0.1–0.9)
CBC WITH DIFFERENTIAL/PLATELET: MONOCYTES: 14 %
CBC WITH DIFFERENTIAL/PLATELET: NEUTROPHILS (ABSOLUTE): 3.4 X10E3/UL (ref 1.4–7)
CBC WITH DIFFERENTIAL/PLATELET: NEUTROPHILS: 56 %
CBC WITH DIFFERENTIAL/PLATELET: NRBC: (no result)
CBC WITH DIFFERENTIAL/PLATELET: PLATELETS: 281 X10E3/UL (ref 150–450)
CBC WITH DIFFERENTIAL/PLATELET: RBC: 4.54 X10E6/UL (ref 3.77–5.28)
CBC WITH DIFFERENTIAL/PLATELET: RDW: 12.8 % (ref 11.7–15.4)
CBC WITH DIFFERENTIAL/PLATELET: WBC: 6.1 X10E3/UL (ref 3.4–10.8)
COMP. METABOLIC PANEL (14): ALBUMIN: 4.1 G/DL (ref 3.7–4.7)
COMP. METABOLIC PANEL (14): ALKALINE PHOSPHATASE: 143 IU/L — HIGH (ref 44–121)
COMP. METABOLIC PANEL (14): ALT (SGPT): 15 IU/L (ref 0–32)
COMP. METABOLIC PANEL (14): AST (SGOT): 21 IU/L (ref 0–40)
COMP. METABOLIC PANEL (14): BILIRUBIN, TOTAL: 0.5 MG/DL (ref 0–1.2)
COMP. METABOLIC PANEL (14): BUN/CREATININE RATIO: 36 — HIGH (ref 12–28)
COMP. METABOLIC PANEL (14): BUN: 31 MG/DL — HIGH (ref 8–27)
COMP. METABOLIC PANEL (14): CALCIUM: 10.8 MG/DL — HIGH (ref 8.7–10.3)
COMP. METABOLIC PANEL (14): CARBON DIOXIDE, TOTAL: 22 MMOL/L (ref 20–29)
COMP. METABOLIC PANEL (14): CHLORIDE: 104 MMOL/L (ref 96–106)
COMP. METABOLIC PANEL (14): CREATININE: 0.86 MG/DL (ref 0.57–1)
COMP. METABOLIC PANEL (14): EGFR: 67 ML/MIN/1.73 (ref 59–?)
COMP. METABOLIC PANEL (14): GLOBULIN, TOTAL: 3.3 G/DL (ref 1.5–4.5)
COMP. METABOLIC PANEL (14): GLUCOSE: 114 MG/DL — HIGH (ref 70–99)
COMP. METABOLIC PANEL (14): POTASSIUM: 5 MMOL/L (ref 3.5–5.2)
COMP. METABOLIC PANEL (14): PROTEIN, TOTAL: 7.4 G/DL (ref 6–8.5)
COMP. METABOLIC PANEL (14): SODIUM: 140 MMOL/L (ref 134–144)
PROTHROMBIN TIME (PT): INR: 1.1 (ref 0.9–1.2)
PROTHROMBIN TIME (PT): PROTHROMBIN TIME: 11.6 SEC (ref 9.1–12)

## 2025-07-22 ENCOUNTER — LAB (OUTPATIENT)
Dept: LAB | Facility: HOSPITAL | Age: 85
End: 2025-07-22
Payer: MEDICARE

## 2025-07-22 ENCOUNTER — TRANSCRIBE ORDERS (OUTPATIENT)
Dept: ADMINISTRATIVE | Facility: HOSPITAL | Age: 85
End: 2025-07-22
Payer: MEDICARE

## 2025-07-22 DIAGNOSIS — R74.8 ELEVATED ALKALINE PHOSPHATASE LEVEL: ICD-10-CM

## 2025-07-22 DIAGNOSIS — R74.8 ELEVATED ALKALINE PHOSPHATASE LEVEL: Primary | ICD-10-CM

## 2025-07-22 LAB — GGT SERPL-CCNC: 56 U/L (ref 5–36)

## 2025-07-22 PROCEDURE — 36415 COLL VENOUS BLD VENIPUNCTURE: CPT

## 2025-07-22 PROCEDURE — 82977 ASSAY OF GGT: CPT

## 2025-07-30 ENCOUNTER — TRANSCRIBE ORDERS (OUTPATIENT)
Dept: ADMINISTRATIVE | Facility: HOSPITAL | Age: 85
End: 2025-07-30
Payer: MEDICARE

## 2025-07-30 DIAGNOSIS — R10.32 ABDOMINAL PAIN, LEFT LOWER QUADRANT: Primary | ICD-10-CM

## 2025-08-21 ENCOUNTER — HOSPITAL ENCOUNTER (OUTPATIENT)
Dept: ULTRASOUND IMAGING | Facility: HOSPITAL | Age: 85
Discharge: HOME | End: 2025-08-21
Admitting: INTERNAL MEDICINE
Payer: MEDICARE

## 2025-08-21 DIAGNOSIS — R10.32 ABDOMINAL PAIN, LEFT LOWER QUADRANT: ICD-10-CM

## 2025-08-21 PROCEDURE — 76700 US EXAM ABDOM COMPLETE: CPT
